# Patient Record
Sex: FEMALE | Race: WHITE | NOT HISPANIC OR LATINO | URBAN - METROPOLITAN AREA
[De-identification: names, ages, dates, MRNs, and addresses within clinical notes are randomized per-mention and may not be internally consistent; named-entity substitution may affect disease eponyms.]

---

## 2020-06-18 ENCOUNTER — OFFICE VISIT - HEALTHEAST (OUTPATIENT)
Dept: FAMILY MEDICINE | Facility: CLINIC | Age: 60
End: 2020-06-18

## 2020-06-18 DIAGNOSIS — T22.112A SUPERFICIAL BURN OF LEFT FOREARM, INITIAL ENCOUNTER: ICD-10-CM

## 2020-06-18 RX ORDER — LEVOTHYROXINE SODIUM 50 UG/1
50 TABLET ORAL DAILY
Status: SHIPPED | COMMUNITY
Start: 2020-04-20

## 2020-06-18 RX ORDER — ESTRADIOL 0.5 MG/1
0.5 TABLET ORAL DAILY
Status: SHIPPED | COMMUNITY
Start: 2020-04-20

## 2020-06-18 RX ORDER — AZELASTINE 1 MG/ML
SPRAY, METERED NASAL
Status: SHIPPED | COMMUNITY
Start: 2019-09-24

## 2020-06-18 RX ORDER — METOPROLOL SUCCINATE 25 MG/1
25 TABLET, EXTENDED RELEASE ORAL DAILY
Status: SHIPPED | COMMUNITY
Start: 2020-03-22

## 2021-01-26 ENCOUNTER — AMBULATORY - HEALTHEAST (OUTPATIENT)
Dept: NURSING | Facility: CLINIC | Age: 61
End: 2021-01-26

## 2021-02-16 ENCOUNTER — AMBULATORY - HEALTHEAST (OUTPATIENT)
Dept: NURSING | Facility: CLINIC | Age: 61
End: 2021-02-16

## 2021-05-27 ENCOUNTER — RECORDS - HEALTHEAST (OUTPATIENT)
Dept: ADMINISTRATIVE | Facility: CLINIC | Age: 61
End: 2021-05-27

## 2021-06-04 VITALS
DIASTOLIC BLOOD PRESSURE: 76 MMHG | SYSTOLIC BLOOD PRESSURE: 147 MMHG | OXYGEN SATURATION: 97 % | HEART RATE: 63 BPM | TEMPERATURE: 98.5 F | WEIGHT: 170 LBS

## 2021-06-18 NOTE — PATIENT INSTRUCTIONS - HE
Patient Instructions by Jose Maria Caceres PA-C at 6/18/2020  2:30 PM     Author: Jose Maria Caceres PA-C Service: -- Author Type: Physician Assistant    Filed: 6/18/2020  2:18 PM Encounter Date: 6/18/2020 Status: Signed    : Jose Maria Caceres PA-C (Physician Assistant)         Patient Education     First- and Second-Degree Burns  A burn occurs when skin is exposed to too much heat, sun, or harsh chemicals. A first-degree burn (superficial burn) causes only redness, like a sunburn. It heals in a few days. A second-degree burn (partial-thickness burn) is deeper and causes a blister to form. This may take up to 2 weeks to heal.  Home care  Follow these guidelines when caring for yourself at home:    On the first day, you may put a cool compress on the burn to relieve severe pain. You can use a small towel soaked in cool water as a cool compress.    If a bandage was put on, change it once a day, unless you were told otherwise. If the bandage sticks, soak it off under warm running water.    Before changing a bandage, wash your hands. Then wash the area with soap and water to remove any cream, ointment, ooze, or scab. You may do this in a sink, under a tub faucet, or in the shower. Rinse off the soap and pat the area dry with a clean towel. Look for signs of infection listed below.    Put on any prescribed cream or ointment to prevent infection. This also keeps the bandage from sticking.    Cover the burn with a nonstick gauze. Then wrap it with the bandage material.    Change the bandage as soon as you can if it gets wet or dirty.    Unless a pain medicine was prescribed, use over-the-counter medicine to control pain. If you have chronic liver or kidney disease, talk with your health care provider before using acetaminophen or ibuprofen. Also talk with your provider if youve had a stomach ulcer or GI bleeding.    Eat more calories and protein until the wound is healed. Drink plenty of water.    Wear a hat, sunscreen,  and long sleeves while in the sun to protect your skin.    Dont pick or scratch at the affected areas.    Wear loose-fitting clothing.  Follow-up care  Follow up with your healthcare provider, or as advised. Most burns heal without becoming infected. Sometimes an infection may occur even with proper treatment. Be sure to check the burn daily for the signs of infection listed below.  When to seek medical advice  Call your healthcare provider right away if any of these signs of infection occur:    Pain in the wound gets worse    Redness or swelling gets worse    Pus comes from the wound    Red streaks in your skin come from the burn    Fever of 100.4  F (38  C) or higher, or as directed by your healthcare provider    Wounds dont appear to be healing    Nausea or vomiting   Date Last Reviewed: 1/1/2017 2000-2017 [employer]. [address] [place] [address], [place] [address]. All rights reserved. This information is not intended as a substitute for professional medical care. Always follow your healthcare professional's instructions.

## 2021-06-29 NOTE — PROGRESS NOTES
Progress Notes by Jose Maria Caceres PA-C at 6/18/2020  2:30 PM     Author: Jose Maria Caceres PA-C Service: -- Author Type: Physician Assistant    Filed: 6/18/2020  2:42 PM Encounter Date: 6/18/2020 Status: Signed    : Jose Maria Caceres PA-C (Physician Assistant)       Chief Complaint:    Chief Complaint   Patient presents with   ? Burn     left forearm DOI: 6/15/20 on a blanket warmer       HPI: Ramila Garber is an 59 y.o. female who presents for evaluation and treatment of L forearm burn.  Patient burned the arm on a blanket warmer 3 days ago.  She has been covering the area with a dressing.  Today she scrapped the arm and noticed some skin that came off and would like this checked.  She denies any pain, numbness, tingling, or swelling of the arm.  N problems with movement of any of the joints in the L arm.  Patient is up to date on tetanus.      ROS:    Review of Systems   Constitutional: Negative.  Negative for activity change, appetite change, fatigue, fever and unexpected weight change.   HENT: Negative.  Negative for congestion, ear discharge, ear pain, sinus pressure, sinus pain, sore throat and trouble swallowing.    Eyes: Negative for pain, discharge, redness and itching.   Respiratory: Negative.  Negative for chest tightness, shortness of breath and wheezing.    Cardiovascular: Negative.  Negative for chest pain and palpitations.   Gastrointestinal: Negative.  Negative for abdominal pain, blood in stool, diarrhea, nausea and vomiting.   Endocrine: Negative.  Negative for polydipsia.   Genitourinary: Negative.  Negative for dysuria, frequency and urgency.   Musculoskeletal: Negative.  Negative for arthralgias and myalgias.   Skin: Positive for color change and wound. Negative for rash.   Allergic/Immunologic: Negative.  Negative for immunocompromised state.   Neurological: Negative.  Negative for dizziness and headaches.   Hematological: Negative.  Negative for adenopathy.        Family History  No  family history on file.    Social History  Social History     Socioeconomic History   ? Marital status:      Spouse name: Not on file   ? Number of children: Not on file   ? Years of education: Not on file   ? Highest education level: Not on file   Occupational History   ? Not on file   Social Needs   ? Financial resource strain: Not on file   ? Food insecurity     Worry: Not on file     Inability: Not on file   ? Transportation needs     Medical: Not on file     Non-medical: Not on file   Tobacco Use   ? Smoking status: Not on file   Substance and Sexual Activity   ? Alcohol use: Not on file   ? Drug use: Not on file   ? Sexual activity: Not on file   Lifestyle   ? Physical activity     Days per week: Not on file     Minutes per session: Not on file   ? Stress: Not on file   Relationships   ? Social connections     Talks on phone: Not on file     Gets together: Not on file     Attends Buddhism service: Not on file     Active member of club or organization: Not on file     Attends meetings of clubs or organizations: Not on file     Relationship status: Not on file   ? Intimate partner violence     Fear of current or ex partner: Not on file     Emotionally abused: Not on file     Physically abused: Not on file     Forced sexual activity: Not on file   Other Topics Concern   ? Not on file   Social History Narrative   ? Not on file        Surgical History:  No past surgical history on file.     Problem List:  There is no problem list on file for this patient.       Allergies:  Allergies   Allergen Reactions   ? Sulfa (Sulfonamide Antibiotics)         Current Meds:    Current Outpatient Medications:   ?  azelastine (ASTELIN) 137 mcg (0.1 %) nasal spray, USE 1 TO 2 SPRAY(S) IN EACH NOSTRIL TWICE DAILY, Disp: , Rfl:   ?  estradioL (ESTRACE) 0.5 MG tablet, Take 0.5 mg by mouth daily., Disp: , Rfl:   ?  fluticasone propionate (FLONASE) 50 mcg/actuation nasal spray, USE 2 SPRAY(S) IN EACH NOSTRIL ONCE DAILY, Disp: ,  Rfl:   ?  levothyroxine (SYNTHROID, LEVOTHROID) 50 MCG tablet, Take 50 mcg by mouth daily., Disp: , Rfl:   ?  metoprolol succinate (TOPROL-XL) 25 MG, Take 25 mg by mouth daily., Disp: , Rfl:      PHYSICAL EXAM:   Vital signs noted and reviewed by Jose Maria Caceres    /76 (Patient Site: Left Arm, Patient Position: Sitting, Cuff Size: Adult Regular)   Pulse 63   Temp 98.5  F (36.9  C) (Oral)   Wt 170 lb (77.1 kg)   SpO2 97%      PEFR:  Physical Exam  Vitals signs reviewed.   Constitutional:       Appearance: Normal appearance. She is well-developed. She is not ill-appearing or toxic-appearing.   HENT:      Head: Normocephalic and atraumatic.      Right Ear: Hearing, tympanic membrane, ear canal and external ear normal. No drainage, swelling or tenderness. Tympanic membrane is not perforated, erythematous, retracted or bulging.      Left Ear: Hearing, tympanic membrane, ear canal and external ear normal. No drainage, swelling or tenderness. Tympanic membrane is not perforated, erythematous, retracted or bulging.      Nose: No nasal deformity, mucosal edema, congestion or rhinorrhea.      Right Sinus: No maxillary sinus tenderness or frontal sinus tenderness.      Left Sinus: No maxillary sinus tenderness or frontal sinus tenderness.      Mouth/Throat:      Pharynx: No pharyngeal swelling, oropharyngeal exudate, posterior oropharyngeal erythema or uvula swelling.      Tonsils: No tonsillar exudate or tonsillar abscesses. 0 on the right. 0 on the left.   Eyes:      General: Lids are normal.         Right eye: No foreign body or discharge.         Left eye: No foreign body or discharge.      Conjunctiva/sclera:      Right eye: Right conjunctiva is not injected.      Left eye: Left conjunctiva is not injected.   Neck:      Musculoskeletal: Full passive range of motion without pain and normal range of motion. No neck rigidity.   Cardiovascular:      Rate and Rhythm: Normal rate and regular rhythm.      Heart sounds:  Normal heart sounds, S1 normal and S2 normal. No murmur. No friction rub. No gallop.    Pulmonary:      Effort: Pulmonary effort is normal. No accessory muscle usage, prolonged expiration, respiratory distress or retractions.      Breath sounds: Normal breath sounds and air entry. No stridor, decreased air movement or transmitted upper airway sounds. No decreased breath sounds, wheezing or rales.   Abdominal:      General: Bowel sounds are normal.      Palpations: Abdomen is soft.      Tenderness: There is no abdominal tenderness. There is no right CVA tenderness or left CVA tenderness.   Musculoskeletal:      Left hand: She exhibits normal range of motion, no tenderness, normal two-point discrimination, normal capillary refill and no swelling.   Lymphadenopathy:      Head:      Right side of head: No submental, submandibular, tonsillar, preauricular or posterior auricular adenopathy.      Left side of head: No submental, submandibular, tonsillar, preauricular or posterior auricular adenopathy.      Cervical:      Right cervical: No superficial or posterior cervical adenopathy.     Left cervical: No superficial or posterior cervical adenopathy.   Skin:     General: Skin is warm.      Coloration: Skin is not cyanotic or jaundiced.             Comments: 1st degree partial thickness burn of the L forearm.     Neurological:      Mental Status: She is alert.      Motor: No weakness or abnormal muscle tone.      Coordination: Coordination is intact.      Gait: Gait is intact. Gait normal.      Deep Tendon Reflexes: Reflexes are normal and symmetric.   Psychiatric:         Attention and Perception: Attention normal.         Mood and Affect: Mood normal.         Speech: Speech normal.         Behavior: Behavior normal.         Thought Content: Thought content normal.          Medical Decision Making:    Differential Diagnosis:    First degree burn     ASSESSMENT:     1. Superficial burn of left forearm, initial encounter          PLAN:  Supportive care.  Keep area clean and dry.  Tylenol for any discomfort.   Dressing changed in clinic today.  Reviewed signs and symptoms of infection with instructions to return.  Patient instructed to follow up with PCP in 1 week if symptoms are not improving.  Sooner if symptoms worsen.  Worrisome symptoms discussed with instructions to go to the ED.  Patient verbalized understanding and agreed with this plan.     Jose Maria Caceres  6/18/2020, 2:14 PM

## 2021-08-21 ENCOUNTER — HEALTH MAINTENANCE LETTER (OUTPATIENT)
Age: 61
End: 2021-08-21

## 2021-10-11 ENCOUNTER — HEALTH MAINTENANCE LETTER (OUTPATIENT)
Age: 61
End: 2021-10-11

## 2022-04-13 ENCOUNTER — LAB REQUISITION (OUTPATIENT)
Dept: LAB | Facility: CLINIC | Age: 62
End: 2022-04-13

## 2022-04-13 PROCEDURE — 86762 RUBELLA ANTIBODY: CPT | Performed by: INTERNAL MEDICINE

## 2022-04-13 PROCEDURE — 86481 TB AG RESPONSE T-CELL SUSP: CPT | Performed by: INTERNAL MEDICINE

## 2022-04-13 PROCEDURE — 86735 MUMPS ANTIBODY: CPT | Performed by: INTERNAL MEDICINE

## 2022-04-13 PROCEDURE — 86765 RUBEOLA ANTIBODY: CPT | Performed by: INTERNAL MEDICINE

## 2022-04-14 LAB
MEV IGG SER IA-ACNC: >300 AU/ML
MEV IGG SER IA-ACNC: POSITIVE
MUMPS ANTIBODY IGG INSTRUMENT VALUE: 38.5 AU/ML
MUV IGG SER QL IA: POSITIVE
QUANTIFERON MITOGEN: 10 IU/ML
QUANTIFERON NIL TUBE: 0.02 IU/ML
QUANTIFERON TB1 TUBE: 0.02 IU/ML
QUANTIFERON TB2 TUBE: 0.02
RUBV IGG SERPL QL IA: 5.42 INDEX
RUBV IGG SERPL QL IA: POSITIVE

## 2022-04-15 LAB
GAMMA INTERFERON BACKGROUND BLD IA-ACNC: 0.02 IU/ML
M TB IFN-G BLD-IMP: NEGATIVE
M TB IFN-G CD4+ BCKGRND COR BLD-ACNC: 9.98 IU/ML
MITOGEN IGNF BCKGRD COR BLD-ACNC: 0 IU/ML
MITOGEN IGNF BCKGRD COR BLD-ACNC: 0 IU/ML

## 2022-09-25 ENCOUNTER — HEALTH MAINTENANCE LETTER (OUTPATIENT)
Age: 62
End: 2022-09-25

## 2023-10-10 RX ORDER — INFLUENZA A VIRUS A/HAWAII/70/2019 (H1N1) RECOMBINANT HEMAGGLUTININ ANTIGEN, INFLUENZA A VIRUS A/MINNESOTA/41/2019 (H3N2) RECOMBINANT HEMAGGLUTININ ANTIGEN, INFLUENZA B VIRUS B/WASHINGTON/02/2019 RECOMBINANT HEMAGGLUTININ ANTIGEN, AND INFLUENZA B VIRUS B/PHUKET/3073/2013 RECOMBINANT HEMAGGLUTININ ANTIGEN 45; 45; 45; 45 UG/.5ML; UG/.5ML; UG/.5ML; UG/.5ML
INJECTION INTRAMUSCULAR
Qty: 0.5 ML | Refills: 0 | OUTPATIENT
Start: 2023-10-10

## 2023-10-14 ENCOUNTER — HEALTH MAINTENANCE LETTER (OUTPATIENT)
Age: 63
End: 2023-10-14

## 2025-06-28 ENCOUNTER — HEALTH MAINTENANCE LETTER (OUTPATIENT)
Age: 65
End: 2025-06-28